# Patient Record
Sex: FEMALE | Race: WHITE | NOT HISPANIC OR LATINO | Employment: OTHER | URBAN - METROPOLITAN AREA
[De-identification: names, ages, dates, MRNs, and addresses within clinical notes are randomized per-mention and may not be internally consistent; named-entity substitution may affect disease eponyms.]

---

## 2017-01-23 DIAGNOSIS — G95.9 CERVICAL MYELOPATHY: Primary | ICD-10-CM

## 2017-02-06 PROBLEM — G95.9 CERVICAL MYELOPATHY: Status: ACTIVE | Noted: 2017-02-06

## 2017-02-16 ENCOUNTER — CLINICAL SUPPORT (OUTPATIENT)
Dept: NEUROSURGERY | Facility: CLINIC | Age: 72
End: 2017-02-16
Payer: MEDICARE

## 2017-02-16 DIAGNOSIS — Z98.1 STATUS POST CERVICAL SPINAL FUSION: Primary | ICD-10-CM

## 2017-02-16 NOTE — PROGRESS NOTES
Pt is 10 days ACDF and corpectomy. No s/s of infection. No sutures or staples to be removed. Pt reports decreased pain from pre operative state but shoulders have been giving her significant pain and she said someone at the hospital told her it was due to surgical positioning.xr and f/u appt reminder given to patient.

## 2017-02-16 NOTE — MR AVS SNAPSHOT
[facility]  [address] [facility] [address]  [facility] [address]  Phone: [phone]  Fax: [phone]                  Aissatou Wilder   2017 1:00 PM   Clinical Support    Description:  Female : 1945   Provider:  [facility]   Department:  [facility]           Diagnoses this Visit        Comments    Status post cervical spinal fusion    -  Primary            To Do List           Future Appointments        Provider Department Dept Phone    3/8/2017 11:00 AM [facility] [id number] [facility] [phone]    3/8/2017 11:30 AM Felipe Egan MD [facility] [phone]      Goals (5 Years of Data)     None      [facility]     [facility] Nurse Care Line -  Assistance  Registered nurses in the [facility] provide clinical advisement, health education, appointment booking, and other advisory services.  Call for this free service at [phone].             Medications           Message regarding Medications     Verify the changes and/or additions to your medication regime listed below are the same as discussed with your clinician today.  If any of these changes or additions are incorrect, please notify your healthcare provider.             Verify that the below list of medications is an accurate representation of the medications you are currently taking.  If none reported, the list may be blank. If incorrect, please contact your healthcare provider. Carry this list with you in case of emergency.           Current Medications     alendronate (FOSAMAX) 70 MG tablet Take 70 mg by mouth every 7 days.    aspirin (ECOTRIN) 81 MG EC tablet Take 81 mg by mouth once daily.    atorvastatin (LIPITOR) 40 MG tablet Take 40 mg by mouth every evening.     clopidogrel (PLAVIX) 75 mg tablet Take 75 mg by mouth once daily.    ergocalciferol (VITAMIN D2) 50,000 unit Cap Take 50,000 Units by mouth every 7 days.     lisinopril-hydrochlorothiazide (PRINZIDE,ZESTORETIC) 20-25 mg Tab Take 1 tablet by mouth once daily.    multivitamin (ONE DAILY MULTIVITAMIN) per tablet Take 1 tablet by mouth once daily.    oxycodone-acetaminophen (PERCOCET) 7.5-325 mg per tablet May take 1-2 tabs by mouth every 4 hours as needed for pain    polyethylene glycol (GLYCOLAX) 17 gram/dose powder Take 17 g by mouth once daily.    tizanidine (ZANAFLEX) 4 MG tablet Take 1 tablet (4 mg total) by mouth every 8 (eight) hours as needed.           Clinical Reference Information           Allergies as of 2/16/2017     No Known Allergies      Immunizations Administered on Date of Encounter - 2/16/2017     None      Orders Placed During Today's Visit     Future Labs/Procedures Expected by Expires    X-Ray Cervical Spine AP And Lateral  2/16/2017 2/16/2018      [facility] Sign-Up     Activating your [facility] account is as easy as 1-2-3!     1) Visit [email or web address], select Sign Up Now, enter this activation code and your date of birth, then select Next.  [email or web address]  Expires: 3/25/2017 11:48 AM      2) Create a username and password to use when you visit [facility] in the future and select a security question in case you lose your password and select Next.    3) Enter your e-mail address and click Sign Up!    Additional Information  If you have questions, please e-mail [email or web address] or call [phone] to talk to our [facility] staff. Remember, [facility] is NOT to be used for urgent needs. For medical emergencies, dial 911.         Language Assistance Services     ATTENTION: Language assistance services are available, free of charge. Please call [phone].      ATENCIÓN: Si habla mirtaanju, tiene a mckinney disposición servicios gratuitos de asistencia lingüística. Llame al [phone].     CHÚ Ý: N?u b?n nói Ti?ng Vi?t, có các d?ch v? h? tr? ngôn ng? mi?n phí dành cho b?n. G?i s? [phone].         [facility] complies with  applicable Federal civil rights laws and does not discriminate on the basis of race, color, national origin, age, disability, or sex.

## 2017-03-16 ENCOUNTER — OFFICE VISIT (OUTPATIENT)
Dept: NEUROSURGERY | Facility: CLINIC | Age: 72
End: 2017-03-16
Payer: MEDICARE

## 2017-03-16 ENCOUNTER — HOSPITAL ENCOUNTER (OUTPATIENT)
Dept: RADIOLOGY | Facility: HOSPITAL | Age: 72
Discharge: HOME | End: 2017-03-16
Attending: NEUROLOGICAL SURGERY
Payer: MEDICARE

## 2017-03-16 VITALS
SYSTOLIC BLOOD PRESSURE: 184 MMHG | BODY MASS INDEX: 24.97 KG/M2 | HEART RATE: 96 BPM | WEIGHT: 132.25 LBS | HEIGHT: 61 IN | DIASTOLIC BLOOD PRESSURE: 75 MMHG

## 2017-03-16 DIAGNOSIS — R27.0 ATAXIA: ICD-10-CM

## 2017-03-16 DIAGNOSIS — M54.40 CHRONIC MIDLINE LOW BACK PAIN WITH SCIATICA, SCIATICA LATERALITY UNSPECIFIED: ICD-10-CM

## 2017-03-16 DIAGNOSIS — G89.29 CHRONIC MIDLINE LOW BACK PAIN WITH SCIATICA, SCIATICA LATERALITY UNSPECIFIED: Primary | ICD-10-CM

## 2017-03-16 DIAGNOSIS — G89.29 CHRONIC MIDLINE LOW BACK PAIN WITH SCIATICA, SCIATICA LATERALITY UNSPECIFIED: ICD-10-CM

## 2017-03-16 DIAGNOSIS — R29.6 FALLS FREQUENTLY: ICD-10-CM

## 2017-03-16 DIAGNOSIS — Z98.1 STATUS POST CERVICAL SPINAL FUSION: ICD-10-CM

## 2017-03-16 DIAGNOSIS — G95.9 MYELOPATHY OF CERVICAL SPINAL CORD WITH CERVICAL RADICULOPATHY: Primary | ICD-10-CM

## 2017-03-16 DIAGNOSIS — M54.9 BACK PAIN, UNSPECIFIED BACK LOCATION, UNSPECIFIED BACK PAIN LATERALITY, UNSPECIFIED CHRONICITY: ICD-10-CM

## 2017-03-16 DIAGNOSIS — Z98.1 S/P CERVICAL SPINAL FUSION: ICD-10-CM

## 2017-03-16 DIAGNOSIS — M54.12 MYELOPATHY OF CERVICAL SPINAL CORD WITH CERVICAL RADICULOPATHY: Primary | ICD-10-CM

## 2017-03-16 DIAGNOSIS — M54.40 CHRONIC MIDLINE LOW BACK PAIN WITH SCIATICA, SCIATICA LATERALITY UNSPECIFIED: Primary | ICD-10-CM

## 2017-03-16 PROCEDURE — 72040 X-RAY EXAM NECK SPINE 2-3 VW: CPT | Mod: 26,,, | Performed by: RADIOLOGY

## 2017-03-16 PROCEDURE — 72114 X-RAY EXAM L-S SPINE BENDING: CPT | Mod: 26,,, | Performed by: RADIOLOGY

## 2017-03-16 PROCEDURE — 99024 POSTOP FOLLOW-UP VISIT: CPT | Mod: S$GLB,,, | Performed by: NEUROLOGICAL SURGERY

## 2017-03-16 PROCEDURE — 72040 X-RAY EXAM NECK SPINE 2-3 VW: CPT | Mod: TC,PO

## 2017-03-16 PROCEDURE — 72114 X-RAY EXAM L-S SPINE BENDING: CPT | Mod: TC,PO

## 2017-03-16 NOTE — MR AVS SNAPSHOT
[facility]  [address] [place] [address]  Phone: [phone]  Fax: [phone]                  Aissatou Wilder   3/16/2017 3:00 PM   Office Visit    Description:  Female : 1945   Provider:  Felipe Egan MD   Department:  [facility]           Reason for Visit     Post-op Evaluation           Diagnoses this Visit        Comments    Myelopathy of cervical spinal cord with cervical radiculopathy    -  Primary     Falls frequently         Back pain, unspecified back location, unspecified back pain laterality, unspecified chronicity         Ataxia         S/P cervical spinal fusion                To Do List           Future Appointments        Provider Department Dept Phone    2017 2:30 PM Felipe Egan MD [facility] [phone]      Goals (5 Years of Data)     None      [facility]     [facility] On [facility] Nurse Care Line - / Assistance  Registered nurses in the [facility] provide clinical advisement, health education, appointment booking, and other advisory services.  Call for this free service at [phone].             Medications           Message regarding Medications     Verify the changes and/or additions to your medication regime listed below are the same as discussed with your clinician today.  If any of these changes or additions are incorrect, please notify your healthcare provider.             Verify that the below list of medications is an accurate representation of the medications you are currently taking.  If none reported, the list may be blank. If incorrect, please contact your healthcare provider. Carry this list with you in case of emergency.           Current Medications     alendronate (FOSAMAX) 70 MG tablet Take 70 mg by mouth every 7 days.    aspirin (ECOTRIN) 81 MG EC tablet Take 81 mg by mouth once daily.    atorvastatin (LIPITOR) 40 MG tablet Take 40 mg by mouth every evening.      "clopidogrel (PLAVIX) 75 mg tablet Take 75 mg by mouth once daily.    ergocalciferol (VITAMIN D2) 50,000 unit Cap Take 50,000 Units by mouth every 7 days.    lisinopril-hydrochlorothiazide (PRINZIDE,ZESTORETIC) 20-25 mg Tab Take 1 tablet by mouth once daily.    multivitamin (ONE DAILY MULTIVITAMIN) per tablet Take 1 tablet by mouth once daily.    oxycodone-acetaminophen (PERCOCET) 7.5-325 mg per tablet May take 1-2 tabs by mouth every 4 hours as needed for pain    polyethylene glycol (GLYCOLAX) 17 gram/dose powder Take 17 g by mouth once daily.           Clinical Reference Information           Your Vitals Were     BP Pulse Height Weight BMI    184/75 96 5' 1" (1.549 m) 60 kg (132 lb 4.4 oz) 24.99 kg/m2      Blood Pressure          Most Recent Value    BP  (!)  184/75      Allergies as of 3/16/2017     No Known Allergies      Immunizations Administered on Date of Encounter - 3/16/2017     None      [facility] Sign-Up     Activating your [facility] account is as easy as 1-2-3!     1) Visit [email or web address], select Sign Up Now, enter this activation code and your date of birth, then select Next.  [email or web address]  Expires: 3/25/2017 12:48 PM      2) Create a username and password to use when you visit [facility] in the future and select a security question in case you lose your password and select Next.    3) Enter your e-mail address and click Sign Up!    Additional Information  If you have questions, please e-mail [email or web address] or call [phone] to talk to our [facility] staff. Remember, [facility] is NOT to be used for urgent needs. For medical emergencies, dial 911.         Language Assistance Services     ATTENTION: Language assistance services are available, free of charge. Please call [phone].      ATENCIÓN: Si habla español, tiene a mckinney disposición servicios gratuitos de asistencia lingüística. Llame al [phone].     CHÚ Ý: N?u b?n nói Ti?ng Vi?t, có các d?ch v? h? tr? ngôn ng? mi?n phí yaz " roxana arevalo?nHazel G?i s? [phone].         [facility] complies with applicable Federal civil rights laws and does not discriminate on the basis of race, color, national origin, age, disability, or sex.

## 2017-03-16 NOTE — PROGRESS NOTES
Neurosurgery History & Physical    Patient ID: Aissatou Wilder is a 71 y.o. female.    Chief Complaint   Patient presents with    Post-op Evaluation     4 weeks s/p C4 corpectomy and C3-5 ACDF. Reports improvement since surgery.        Review of Systems   Constitutional: Negative for appetite change, chills, fever and unexpected weight change.   HENT: Negative for tinnitus, trouble swallowing and voice change.    Respiratory: Negative for apnea, cough, chest tightness and shortness of breath.    Cardiovascular: Negative for chest pain and palpitations.   Gastrointestinal: Negative for constipation, diarrhea, nausea and vomiting.   Genitourinary: Negative for difficulty urinating, dysuria, frequency and urgency.   Musculoskeletal: Positive for back pain. Negative for gait problem.   Skin: Negative for wound.   Neurological: Positive for numbness. Negative for dizziness, tremors, seizures, facial asymmetry, speech difficulty, weakness and light-headedness.   Psychiatric/Behavioral: Negative for confusion and decreased concentration.   All other systems reviewed and are negative.      Past Medical History:   Diagnosis Date    Bunion     Coronary artery disease     Encounter for blood transfusion     Fatty tumor     right upper arm    Fracture of foot bone, left, closed     Fracture of wrist, closed     Hyperlipidemia     Hypertension     Nasal bone fracture     Osteoporosis      Social History     Social History    Marital status:      Spouse name: N/A    Number of children: N/A    Years of education: N/A     Occupational History    Not on file.     Social History Main Topics    Smoking status: Never Smoker    Smokeless tobacco: Not on file    Alcohol use Not on file    Drug use: Not on file    Sexual activity: Not on file     Other Topics Concern    Not on file     Social History Narrative     Family History   Problem Relation Age of Onset    Heart disease Mother     Heart disease  "Father     Diabetes Sister      Review of patient's allergies indicates:  No Known Allergies    Current Outpatient Prescriptions:     alendronate (FOSAMAX) 70 MG tablet, Take 70 mg by mouth every 7 days., Disp: , Rfl:     aspirin (ECOTRIN) 81 MG EC tablet, Take 81 mg by mouth once daily., Disp: , Rfl:     atorvastatin (LIPITOR) 40 MG tablet, Take 40 mg by mouth every evening. , Disp: , Rfl:     clopidogrel (PLAVIX) 75 mg tablet, Take 75 mg by mouth once daily., Disp: , Rfl:     ergocalciferol (VITAMIN D2) 50,000 unit Cap, Take 50,000 Units by mouth every 7 days., Disp: , Rfl:     lisinopril-hydrochlorothiazide (PRINZIDE,ZESTORETIC) 20-25 mg Tab, Take 1 tablet by mouth once daily., Disp: , Rfl:     multivitamin (ONE DAILY MULTIVITAMIN) per tablet, Take 1 tablet by mouth once daily., Disp: , Rfl:     oxycodone-acetaminophen (PERCOCET) 7.5-325 mg per tablet, May take 1-2 tabs by mouth every 4 hours as needed for pain, Disp: 60 tablet, Rfl: 0    polyethylene glycol (GLYCOLAX) 17 gram/dose powder, Take 17 g by mouth once daily., Disp: 1 Bottle, Rfl: 1  Blood pressure (!) 184/75, pulse 96, height 5' 1" (1.549 m), weight 60 kg (132 lb 4.4 oz).      Neurologic Exam     Mental Status   Oriented to person, place, and time.   Follows 3 step commands.   Attention: normal. Concentration: normal.   Speech: speech is normal   Level of consciousness: alert  Knowledge: consistent with education.   Able to name object. Normal comprehension.     Cranial Nerves     CN II   Visual acuity: normal  Right visual field deficit: none  Left visual field deficit: none     CN III, IV, VI   Pupils are equal, round, and reactive to light.  Extraocular motions are normal.   Right pupil: Size: 3 mm. Shape: regular. Reactivity: brisk. Consensual response: intact. Accommodation: intact.   Left pupil: Size: 3 mm. Shape: regular. Reactivity: brisk. Consensual response: intact. Accommodation: intact.   CN III: no CN III palsy  Nystagmus: none "   Diplopia: none  Ophthalmoparesis: none  Conjugate gaze: present    CN V   Right facial sensation deficit: none  Left facial sensation deficit: none    CN VII   Right facial weakness: none  Left facial weakness: none    CN VIII   Hearing: intact    CN IX, X   CN IX normal.   CN X normal.     CN XI   Right sternocleidomastoid strength: normal  Left sternocleidomastoid strength: normal  Right trapezius strength: normal  Left trapezius strength: normal    CN XII   Fasciculations: absent  Tongue deviation: none    Motor Exam   Muscle bulk: decreased  Overall muscle tone: normal  Right arm pronator drift: absent  Left arm pronator drift: absent    Strength   Strength 5/5 except as noted.   Right interossei: 4/5  Left interossei: 4/5  Right quadriceps: 4/5  Left quadriceps: 4/5       Right hand intrinsic weakness 4+/5  Hip flexion weakness 4+/5       Sensory Exam   Right arm light touch: decreased from wrist  Left arm light touch: decreased from wrist  Right leg light touch: decreased from knee  Left leg light touch: decreased from knee  Vibration normal.   Right arm pinprick: decreased from wrist  Left arm pinprick: decreased from wrist  Right leg pinprick: decreased from knee  Left leg pinprick: decreased from knee    Gait, Coordination, and Reflexes     Coordination   Romberg: positive  Finger to nose coordination: normal  Heel to shin coordination: normal  Tandem walking coordination: abnormal    Tremor   Resting tremor: absent  Intention tremor: absent  Action tremor: absent    Reflexes   Right brachioradialis: 3+  Left brachioradialis: 3+  Right biceps: 3+  Left biceps: 3+  Right triceps: 3+  Left triceps: 3+  Right patellar: 3+  Left patellar: 3+  Right achilles: 3+  Left achilles: 3+  Right : 3+  Left : 3+  Right plantar: normal  Left plantar: normal  Right Mancia: present  Left Mancia: present  Right ankle clonus: absent  Left ankle clonus: absent       ataxia       Physical Exam   Constitutional: She  is oriented to person, place, and time. She appears well-developed and well-nourished.   HENT:   Head: Normocephalic and atraumatic.   Eyes: EOM are normal. Pupils are equal, round, and reactive to light.   Neck: Normal range of motion. Neck supple.   Cardiovascular: Normal rate and intact distal pulses.    Pulmonary/Chest: Effort normal. No respiratory distress.   Abdominal: Soft. She exhibits no distension.   Musculoskeletal: Normal range of motion. She exhibits no edema or deformity.   Neurological: She is oriented to person, place, and time. She has an abnormal Romberg Test and an abnormal Tandem Gait Test. She has a normal Finger-Nose-Finger Test and a normal Heel to Shin Test.   Reflex Scores:       Tricep reflexes are 3+ on the right side and 3+ on the left side.       Bicep reflexes are 3+ on the right side and 3+ on the left side.       Brachioradialis reflexes are 3+ on the right side and 3+ on the left side.       Patellar reflexes are 3+ on the right side and 3+ on the left side.       Achilles reflexes are 3+ on the right side and 3+ on the left side.  Skin: Skin is warm and dry.   Well healed anterior neck incision   Psychiatric: She has a normal mood and affect. Her speech is normal and behavior is normal. Judgment and thought content normal.   Nursing note and vitals reviewed.      Oswestry Disability Index score: 0    Patient Health Questionnaire score: 1    Provider dictation:  The patient is a 71 year-old right-handed  female following up 4 weeks s/p C4 corpectomy and C4-7 ACCF for myelopathy.    She has continued to have gait unsteadiness but her neck pain and hand numbness have resolved. The inisicon is well healed.    Clinical exam significant for known hyperreflexia throughout, ataxia, and mild weakness in right hand intrinsics. She has positive Mancia's sign bilaterally.     The xray post-op show excellent positioning of instrumentation. The known carpal and ulnar neuropathies can  be treated thereafter if needed. She reports low back pain and we will obtain lumbar xrays before her next visit.    We have fitted her with a bone growth stimulator.    There are no diagnoses linked to this encounter.

## 2017-05-12 ENCOUNTER — TELEPHONE (OUTPATIENT)
Dept: NEUROSURGERY | Facility: CLINIC | Age: 72
End: 2017-05-12

## 2017-05-12 NOTE — TELEPHONE ENCOUNTER
Patient is now 3 months s/p C4 corpectomy. Called to inquire about post-operative state. No answer. Left voicemail.

## 2017-05-12 NOTE — TELEPHONE ENCOUNTER
Patient is now 3 months s/p C4 corpectomy. Called to inquire about post-operative state. Patient is showing significant improvement since the surgery. denies weakness or numbness in extremities. Patient states she continues to have some dysphagia but this continues to improve as well.     Patient very pleased with surgical outcome. Instructed to call our office with any future questions/concerns or if any recurrent pain occurs. Patient is scheduled to come in late June to have her low back pain evaluated. Verbalized understanding.     Updated oswestry: 32 % --- assessing for lumbar spine pain as well.

## 2017-06-22 ENCOUNTER — OFFICE VISIT (OUTPATIENT)
Dept: NEUROSURGERY | Facility: CLINIC | Age: 72
End: 2017-06-22
Payer: MEDICARE

## 2017-06-22 VITALS
HEART RATE: 68 BPM | HEIGHT: 61 IN | BODY MASS INDEX: 24.97 KG/M2 | WEIGHT: 132.25 LBS | SYSTOLIC BLOOD PRESSURE: 114 MMHG | DIASTOLIC BLOOD PRESSURE: 82 MMHG

## 2017-06-22 DIAGNOSIS — G89.29 CHRONIC BILATERAL LOW BACK PAIN, WITH SCIATICA PRESENCE UNSPECIFIED: ICD-10-CM

## 2017-06-22 DIAGNOSIS — Z98.890 HISTORY OF LUMBAR SURGERY: ICD-10-CM

## 2017-06-22 DIAGNOSIS — Z98.890 H/O CERVICAL SPINE SURGERY: Primary | ICD-10-CM

## 2017-06-22 DIAGNOSIS — M54.5 CHRONIC BILATERAL LOW BACK PAIN, WITH SCIATICA PRESENCE UNSPECIFIED: ICD-10-CM

## 2017-06-22 DIAGNOSIS — R29.6 FALLS FREQUENTLY: ICD-10-CM

## 2017-06-22 PROCEDURE — 1159F MED LIST DOCD IN RCRD: CPT | Mod: S$GLB,,, | Performed by: PHYSICIAN ASSISTANT

## 2017-06-22 PROCEDURE — 1125F AMNT PAIN NOTED PAIN PRSNT: CPT | Mod: S$GLB,,, | Performed by: PHYSICIAN ASSISTANT

## 2017-06-22 PROCEDURE — 99213 OFFICE O/P EST LOW 20 MIN: CPT | Mod: S$GLB,,, | Performed by: PHYSICIAN ASSISTANT

## 2017-06-23 NOTE — PROGRESS NOTES
Neurosurgery History & Physical    Patient ID: Aissatou Wilder is a 71 y.o. female.    Chief Complaint   Patient presents with    Follow-up     Patient is now 4 months s/p C4 corpectomy and C4-7 ACCF for myelopathy. States her neck continues to improve. She is here to follow up for her back pain.        Review of Systems   Constitutional: Negative for appetite change, chills, fever and unexpected weight change.   HENT: Negative for tinnitus, trouble swallowing and voice change.    Respiratory: Negative for apnea, cough, chest tightness and shortness of breath.    Cardiovascular: Negative for chest pain and palpitations.   Gastrointestinal: Negative for constipation, diarrhea, nausea and vomiting.   Genitourinary: Negative for difficulty urinating, dysuria, frequency and urgency.   Musculoskeletal: Positive for back pain. Negative for gait problem.   Skin: Negative for wound.   Neurological: Positive for numbness. Negative for dizziness, tremors, seizures, facial asymmetry, speech difficulty, weakness and light-headedness.   Psychiatric/Behavioral: Negative for confusion and decreased concentration.   All other systems reviewed and are negative.      Past Medical History:   Diagnosis Date    Bunion     Coronary artery disease     Encounter for blood transfusion     Fatty tumor     right upper arm    Fracture of foot bone, left, closed     Fracture of wrist, closed     Hyperlipidemia     Hypertension     Nasal bone fracture     Osteoporosis      Social History     Social History    Marital status:      Spouse name: N/A    Number of children: N/A    Years of education: N/A     Occupational History    Not on file.     Social History Main Topics    Smoking status: Never Smoker    Smokeless tobacco: Not on file    Alcohol use Not on file    Drug use: Unknown    Sexual activity: Not on file     Other Topics Concern    Not on file     Social History Narrative    No narrative on file     Family  "History   Problem Relation Age of Onset    Heart disease Mother     Heart disease Father     Diabetes Sister      Review of patient's allergies indicates:  No Known Allergies    Current Outpatient Prescriptions:     alendronate (FOSAMAX) 70 MG tablet, Take 70 mg by mouth every 7 days., Disp: , Rfl:     aspirin (ECOTRIN) 81 MG EC tablet, Take 81 mg by mouth once daily., Disp: , Rfl:     atorvastatin (LIPITOR) 40 MG tablet, Take 40 mg by mouth every evening. , Disp: , Rfl:     clopidogrel (PLAVIX) 75 mg tablet, Take 75 mg by mouth once daily., Disp: , Rfl:     ergocalciferol (VITAMIN D2) 50,000 unit Cap, Take 50,000 Units by mouth every 7 days., Disp: , Rfl:     lisinopril-hydrochlorothiazide (PRINZIDE,ZESTORETIC) 20-25 mg Tab, Take 1 tablet by mouth once daily., Disp: , Rfl:     multivitamin (ONE DAILY MULTIVITAMIN) per tablet, Take 1 tablet by mouth once daily., Disp: , Rfl:     oxycodone-acetaminophen (PERCOCET) 7.5-325 mg per tablet, May take 1-2 tabs by mouth every 4 hours as needed for pain, Disp: 60 tablet, Rfl: 0    polyethylene glycol (GLYCOLAX) 17 gram/dose powder, Take 17 g by mouth once daily., Disp: 1 Bottle, Rfl: 1  Blood pressure 114/82, pulse 68, height 5' 1" (1.549 m), weight 60 kg (132 lb 4.4 oz).      Neurologic Exam     Mental Status   Oriented to person, place, and time.   Follows 3 step commands.   Attention: normal. Concentration: normal.   Speech: speech is normal   Level of consciousness: alert  Knowledge: consistent with education.   Able to name object. Normal comprehension.     Cranial Nerves     CN II   Visual acuity: normal  Right visual field deficit: none  Left visual field deficit: none     CN III, IV, VI   Pupils are equal, round, and reactive to light.  Extraocular motions are normal.   Right pupil: Size: 3 mm. Shape: regular. Reactivity: brisk. Consensual response: intact. Accommodation: intact.   Left pupil: Size: 3 mm. Shape: regular. Reactivity: brisk. Consensual " response: intact. Accommodation: intact.   CN III: no CN III palsy  Nystagmus: none   Diplopia: none  Ophthalmoparesis: none  Conjugate gaze: present    CN V   Right facial sensation deficit: none  Left facial sensation deficit: none    CN VII   Right facial weakness: none  Left facial weakness: none    CN VIII   Hearing: intact    CN IX, X   CN IX normal.   CN X normal.     CN XI   Right sternocleidomastoid strength: normal  Left sternocleidomastoid strength: normal  Right trapezius strength: normal  Left trapezius strength: normal    CN XII   Fasciculations: absent  Tongue deviation: none    Motor Exam   Muscle bulk: decreased  Overall muscle tone: normal  Right arm pronator drift: absent  Left arm pronator drift: absent    Strength   Strength 5/5 except as noted.   Right interossei: 4/5  Left interossei: 4/5  Right quadriceps: 4/5  Left quadriceps: 4/5Right hand intrinsic weakness 4+/5  Hip flexion weakness 4+/5       Sensory Exam   Right arm light touch: decreased from wrist  Left arm light touch: decreased from wrist  Right leg light touch: decreased from knee  Left leg light touch: decreased from knee  Vibration normal.   Right arm pinprick: decreased from wrist  Left arm pinprick: decreased from wrist  Right leg pinprick: decreased from knee  Left leg pinprick: decreased from knee    Gait, Coordination, and Reflexes     Coordination   Romberg: positive  Finger to nose coordination: normal  Heel to shin coordination: normal  Tandem walking coordination: abnormal    Tremor   Resting tremor: absent  Intention tremor: absent  Action tremor: absent    Reflexes   Right brachioradialis: 3+  Left brachioradialis: 3+  Right biceps: 3+  Left biceps: 3+  Right triceps: 3+  Left triceps: 3+  Right patellar: 3+  Left patellar: 3+  Right achilles: 3+  Left achilles: 3+  Right : 3+  Left : 3+  Right plantar: normal  Left plantar: normal  Right Mancia: present  Left Mancia: present  Right ankle clonus: absent  Left  ankle clonus: absentataxia       Physical Exam   Constitutional: She is oriented to person, place, and time. She appears well-developed and well-nourished.   HENT:   Head: Normocephalic and atraumatic.   Eyes: EOM are normal. Pupils are equal, round, and reactive to light.   Neck: Normal range of motion. Neck supple.   Cardiovascular: Normal rate and intact distal pulses.    Pulmonary/Chest: Effort normal. No respiratory distress.   Abdominal: Soft. She exhibits no distension.   Musculoskeletal: Normal range of motion. She exhibits no edema or deformity.   Neurological: She is oriented to person, place, and time. She has an abnormal Romberg Test and an abnormal Tandem Gait Test. She has a normal Finger-Nose-Finger Test and a normal Heel to Shin Test.   Reflex Scores:       Tricep reflexes are 3+ on the right side and 3+ on the left side.       Bicep reflexes are 3+ on the right side and 3+ on the left side.       Brachioradialis reflexes are 3+ on the right side and 3+ on the left side.       Patellar reflexes are 3+ on the right side and 3+ on the left side.       Achilles reflexes are 3+ on the right side and 3+ on the left side.  Skin: Skin is warm and dry.   Well healed anterior neck incision   Psychiatric: She has a normal mood and affect. Her speech is normal and behavior is normal. Judgment and thought content normal.   Nursing note and vitals reviewed.    Provider dictation:  The patient is a 71 year-old right-handed  female following up 4 months s/p C4 corpectomy and C4-7 ACCF for myelopathy and for lower back pain after undergoing lumbar xrays.     She has continued to have gait unsteadiness and dizziness but her neck pain and hand numbness have resolved.  Dizziness causes her to fall at times.  She is working with her PCP to determine a cause.  We discussed the importance of preventing falls affecting her neck.  She should use a walker or cane to steady her self and prevent falls.     She has had  intermittent bilateral hip pain and midline lower back pain.  She has intermittent brief episodes of achiness in the bilateral legs.  She has had 2 lumbar surgeries in the past.    Clinical exam significant for known hyperreflexia throughout, ataxia, and mild weakness in right hand intrinsics. She has positive Mancia's sign bilaterally. She has full strength and sensation in the legs.     Lumbar spine xrays from 3-16-17 reveal degenerative facet arthropathy and disk hidght loss at L4/5 and L5/S1.  There is 6mm anterolisthesis of L4 on L5 with no evidence of instabiliyt.    She is doing well in regards to her cervical spine and should continue to wear the bone growth stimulator previosusly ordered until 6 months post op.  In regards to her lower back, she does have degenerative changes.  It is unclear from her intermittent symptoms if there is significatn nerve compression in the lumbar spine to contribute to her back and leg pain.  We discussed obtaining an MRI lumbar spine, but she prefers not to at this time as she is not interested in further treatment for her back at this time.  If her back ever does bother her enough taht she would like further evaluation, we will get an MRI at that time.    Aissatou was seen today for follow-up.    Diagnoses and all orders for this visit:    H/O cervical spine surgery    History of lumbar surgery    Chronic bilateral low back pain, with sciatica presence unspecified    Falls frequently

## 2018-10-09 DIAGNOSIS — M50.30 DDD (DEGENERATIVE DISC DISEASE), CERVICAL: Primary | ICD-10-CM

## 2018-10-11 ENCOUNTER — OFFICE VISIT (OUTPATIENT)
Dept: NEUROSURGERY | Facility: CLINIC | Age: 73
End: 2018-10-11
Payer: MEDICARE

## 2018-10-11 ENCOUNTER — HOSPITAL ENCOUNTER (OUTPATIENT)
Dept: RADIOLOGY | Facility: HOSPITAL | Age: 73
Discharge: HOME | End: 2018-10-11
Attending: PHYSICIAN ASSISTANT
Payer: MEDICARE

## 2018-10-11 VITALS
RESPIRATION RATE: 16 BRPM | BODY MASS INDEX: 24.92 KG/M2 | SYSTOLIC BLOOD PRESSURE: 117 MMHG | HEIGHT: 61 IN | TEMPERATURE: 96 F | DIASTOLIC BLOOD PRESSURE: 82 MMHG | HEART RATE: 67 BPM | WEIGHT: 132 LBS

## 2018-10-11 DIAGNOSIS — M50.30 DDD (DEGENERATIVE DISC DISEASE), CERVICAL: ICD-10-CM

## 2018-10-11 DIAGNOSIS — Z98.1 S/P CERVICAL SPINAL FUSION: ICD-10-CM

## 2018-10-11 DIAGNOSIS — G95.9 CERVICAL MYELOPATHY: Primary | ICD-10-CM

## 2018-10-11 PROCEDURE — 72052 X-RAY EXAM NECK SPINE 6/>VWS: CPT | Mod: 26,,, | Performed by: RADIOLOGY

## 2018-10-11 PROCEDURE — 3288F FALL RISK ASSESSMENT DOCD: CPT | Mod: CPTII,S$GLB,, | Performed by: PHYSICIAN ASSISTANT

## 2018-10-11 PROCEDURE — 72052 X-RAY EXAM NECK SPINE 6/>VWS: CPT | Mod: TC,FY,PO

## 2018-10-11 PROCEDURE — 1100F PTFALLS ASSESS-DOCD GE2>/YR: CPT | Mod: CPTII,S$GLB,, | Performed by: PHYSICIAN ASSISTANT

## 2018-10-11 PROCEDURE — 99213 OFFICE O/P EST LOW 20 MIN: CPT | Mod: S$GLB,,, | Performed by: PHYSICIAN ASSISTANT

## 2018-10-11 PROCEDURE — 99999 PR PBB SHADOW E&M-EST. PATIENT-LVL IV: CPT | Mod: PBBFAC,,, | Performed by: PHYSICIAN ASSISTANT

## 2018-10-11 NOTE — LETTER
October 19, 2018      Mariusz Duenas MD  [address]  [facility] [facility] [address]           [place] [facility]  [address] [facility] [address] [place] [address]  Phone: [phone]  Fax: [phone]          Patient: Aissatou Wilder   MR Number: [id number]   YOB: 1945   Date of Visit: 10/11/2018       Dear Dr. Mariusz Duenas:    Thank you for referring Aissatou Wilder to me for evaluation. Attached you will find relevant portions of my assessment and plan of care.    If you have questions, please do not hesitate to call me. I look forward to following Aissatou Wilder along with you.    Sincerely,    Rickey Mazariegos  CC:  No Recipients    If you would like to receive this communication electronically, please contact [email or web address] or [phone] to request more information on [facility] Link access.    For providers and/or their staff who would like to refer a patient to [facility], please contact us through our one-stop-shop provider referral line, [facility], at [phone].    If you feel you have received this communication in error or would no longer like to receive these types of communications, please e-mail [email or web address]

## 2018-10-23 ENCOUNTER — OFFICE VISIT (OUTPATIENT)
Dept: NEUROLOGY | Facility: CLINIC | Age: 73
End: 2018-10-23
Payer: MEDICARE

## 2018-10-23 VITALS
HEART RATE: 70 BPM | DIASTOLIC BLOOD PRESSURE: 81 MMHG | WEIGHT: 119 LBS | SYSTOLIC BLOOD PRESSURE: 152 MMHG | BODY MASS INDEX: 22.47 KG/M2 | HEIGHT: 61 IN | RESPIRATION RATE: 18 BRPM

## 2018-10-23 DIAGNOSIS — R29.6 FALLS FREQUENTLY: ICD-10-CM

## 2018-10-23 DIAGNOSIS — R27.0 ATAXIA: ICD-10-CM

## 2018-10-23 DIAGNOSIS — G31.84 MCI (MILD COGNITIVE IMPAIRMENT) WITH MEMORY LOSS: Primary | ICD-10-CM

## 2018-10-23 PROCEDURE — 99205 OFFICE O/P NEW HI 60 MIN: CPT | Mod: S$PBB,,, | Performed by: NURSE PRACTITIONER

## 2018-10-23 PROCEDURE — 1100F PTFALLS ASSESS-DOCD GE2>/YR: CPT | Mod: CPTII,,, | Performed by: NURSE PRACTITIONER

## 2018-10-23 PROCEDURE — 99214 OFFICE O/P EST MOD 30 MIN: CPT | Mod: PBBFAC,PN | Performed by: NURSE PRACTITIONER

## 2018-10-23 PROCEDURE — 99999 PR PBB SHADOW E&M-EST. PATIENT-LVL IV: CPT | Mod: PBBFAC,,, | Performed by: NURSE PRACTITIONER

## 2018-10-23 PROCEDURE — 3288F FALL RISK ASSESSMENT DOCD: CPT | Mod: CPTII,,, | Performed by: NURSE PRACTITIONER

## 2018-10-23 RX ORDER — AMIODARONE HYDROCHLORIDE 200 MG/1
TABLET ORAL DAILY
COMMUNITY

## 2018-10-23 RX ORDER — METOPROLOL SUCCINATE 25 MG/1
25 TABLET, EXTENDED RELEASE ORAL DAILY
COMMUNITY

## 2018-10-23 RX ORDER — LEVOTHYROXINE SODIUM 100 UG/1
100 TABLET ORAL DAILY
COMMUNITY

## 2018-10-23 RX ORDER — MIDODRINE HYDROCHLORIDE 10 MG/1
10 TABLET ORAL 3 TIMES DAILY
Refills: 3 | COMMUNITY
Start: 2018-10-06

## 2018-10-23 NOTE — PROGRESS NOTES
"       Patient ID: Aissatou Wilder is a 73 y.o. female.    Chief Complaint:  Memory Loss    History of Present Illness  Mrs. Wilder is a 73 year old female here for evaluation of complaints of memory loss.  Patient does not feel she has any issues with her memory.   reports she is more repetitive. Forgets shy she enters rooms. Goes to the store and forgets what she was supposed to .   manages her medications. Cooking without incident.  Drives in the neighborhood without incidents.She has not driven for a few months because her car is broken.  She lives in close walking distance to [employer] and several restaurants.  Remembers to feed her 3 dogs.  She has 3 daughters that are not involved in her care.  She is angry at her  and PCP for scheduling this visit. She thinks her  is trying to "put her somewhere".  She walks to the store several times a day for her .  She has no difficulty managing her home and arranging meals.   requires 24 hour O2 support.  Spends his day in the recliner.  Can only walk short distances.    Review of Systems  Review of Systems   Constitutional: Positive for fatigue.   HENT: Negative.    Eyes: Negative.    Respiratory: Negative.    Cardiovascular: Negative.    Gastrointestinal: Positive for nausea.        Difficulty swallowing   Genitourinary: Positive for frequency.        Urinary incontinence   Musculoskeletal: Positive for back pain, joint swelling, myalgias and neck pain.   Neurological: Positive for dizziness.        Memory loss   Hematological: Bruises/bleeds easily.        Recently stopped Plavix       Objective:      Neurologic Exam    Physical Exam       No imaging to review.    No recent labs to review.  Started on thyroid medication yesterday. I do not know what the results of her TSH.    Assessment:     MCI with Memory Loss    Plan:       -Reviewed results of cognitive screen and answered questions.  Patient became " "very aggravated during testing.  She refused to finish test after she had difficulty with VS/Executive function, immediate recall, attention and serial 7s. She refused any further cognitive testing.  We talked at length about the reason for this visit.  She is very angry with her  and PCP for arranging this appointment.  She continually states there is nothing wrong with her.  She insists her  is trying to make her appear crazy.  I insured her I only wanted to help her and I had no reason to think she was "Crazy"  I just wanted to determine if she might have some signs of short term memory issues that were more than normal for her age. If we did assess some cognitive issues, I might be able to offer some medications to enhance her cognitive function.  She was receptive to my opinion but does not want to finish testing or any further diagnostic tests.  I has asked her to think about coming back in 6 months if she feels she is having more memory loss.  She has agreed to this plan.   -Discussed MCI vs normal aging  -Diagnostic work up- MRI Refuses MRI.  Need labs from PCP office.  I am assuming TSH was high since she started on medication yesterday for thyroid supplementation.  Discussed possible memory issues related to hypothyroidism  -Discussed safety issues related to MCI- medication management, cooking, managing finances, driving  assists with medication and financial management.  -Discussed medications for cognitive enhancement- AChEIs and Namenda CR  Refused to discuss medications.  Would recommend Donepezil titration after thyroid normalizes.  She can discuss with PCP  -Discussed NP testing .  Does not want NP testing at this time  -Follow up in 6 months if patient is willing.  Can continue care with PCP at this time.    "

## 2018-10-23 NOTE — LETTER
October 23, 2018      Vida Kunz MD  [address]  [place] [address]           [facility]  [address] [facility] [address] [place] [address]  Phone: [phone]  Fax: [phone]          Patient: Aissatou Wilder   MR Number: [id number]   YOB: 1945   Date of Visit: 10/23/2018       Dear Dr. Vida Kunz:    Thank you for referring Aissatou Wilder to me for evaluation. Attached you will find relevant portions of my assessment and plan of care.    If you have questions, please do not hesitate to call me. I look forward to following Aissatou Wilder along with you.    Sincerely,    Chiara Holder, ANTHONY    Enclosure  CC:  No Recipients    If you would like to receive this communication electronically, please contact [email or web address] or [phone] to request more information on [facility] Link access.    For providers and/or their staff who would like to refer a patient to [facility], please contact us through our one-stop-shop provider referral line, [facility], at [phone].    If you feel you have received this communication in error or would no longer like to receive these types of communications, please e-mail [email or web address]

## 2018-11-05 ENCOUNTER — TELEPHONE (OUTPATIENT)
Dept: NEUROSURGERY | Facility: CLINIC | Age: 73
End: 2018-11-05

## 2018-11-05 NOTE — TELEPHONE ENCOUNTER
Left message with office staff to have Dr. Kunz return our call re: message below.   ----- Message from Feliz Bhat sent at 11/5/2018  9:27 AM CST -----  Type: Needs Medical Advice    Who Called:  Dr. Vida Kunz    Best Call Back Number: [phone]  Additional Information: Caller states that she needs information on the patient's visit on 10/11  Please call to advise

## 2018-11-05 NOTE — PROGRESS NOTES
Neurosurgery History & Physical    Patient ID: Aissatou Wilder is a 73 y.o. female.    Chief Complaint   Patient presents with    Follow-up     pt states she received a phone call from [facility] as a reminder to follow up with our office. pt denies any pain today.       Review of Systems   Constitutional: Negative for appetite change, chills, fever and unexpected weight change.   HENT: Negative for tinnitus, trouble swallowing and voice change.    Respiratory: Negative for apnea, cough, chest tightness and shortness of breath.    Cardiovascular: Negative for chest pain and palpitations.   Gastrointestinal: Negative for constipation, diarrhea, nausea and vomiting.   Genitourinary: Negative for difficulty urinating, dysuria, frequency and urgency.   Musculoskeletal: Positive for back pain. Negative for gait problem.   Skin: Negative for wound.   Neurological: Positive for numbness. Negative for dizziness, tremors, seizures, facial asymmetry, speech difficulty, weakness and light-headedness.   Psychiatric/Behavioral: Negative for confusion and decreased concentration.   All other systems reviewed and are negative.      Past Medical History:   Diagnosis Date    Bunion     Coronary artery disease     Encounter for blood transfusion     Fatty tumor     right upper arm    Fracture of foot bone, left, closed     Fracture of wrist, closed     Hyperlipidemia     Hypertension     Nasal bone fracture     Osteoporosis      Social History     Socioeconomic History    Marital status:      Spouse name: Not on file    Number of children: Not on file    Years of education: Not on file    Highest education level: Not on file   Social Needs    Financial resource strain: Not on file    Food insecurity - worry: Not on file    Food insecurity - inability: Not on file    Transportation needs - medical: Not on file    Transportation needs - non-medical: Not on file   Occupational History    Not on file   Tobacco  "Use    Smoking status: Never Smoker   Substance and Sexual Activity    Alcohol use: No     Frequency: Never    Drug use: No    Sexual activity: Not on file   Other Topics Concern    Not on file   Social History Narrative    Not on file     Family History   Problem Relation Age of Onset    Heart disease Mother     Heart disease Father     Diabetes Sister      Review of patient's allergies indicates:  No Known Allergies    Current Outpatient Medications:     alendronate (FOSAMAX) 70 MG tablet, Take 70 mg by mouth every 7 days., Disp: , Rfl:     aspirin (ECOTRIN) 81 MG EC tablet, Take 81 mg by mouth once daily., Disp: , Rfl:     atorvastatin (LIPITOR) 40 MG tablet, Take 40 mg by mouth every evening. , Disp: , Rfl:     ergocalciferol (VITAMIN D2) 50,000 unit Cap, Take 50,000 Units by mouth every 7 days., Disp: , Rfl:     lisinopril-hydrochlorothiazide (PRINZIDE,ZESTORETIC) 20-25 mg Tab, Take 1 tablet by mouth once daily., Disp: , Rfl:     multivitamin (ONE DAILY MULTIVITAMIN) per tablet, Take 1 tablet by mouth once daily., Disp: , Rfl:     amiodarone (PACERONE) 200 MG Tab, Take by mouth once daily., Disp: , Rfl:     clopidogrel (PLAVIX) 75 mg tablet, Take 75 mg by mouth once daily., Disp: , Rfl:     levothyroxine (SYNTHROID) 100 MCG tablet, Take 100 mcg by mouth once daily., Disp: , Rfl:     metoprolol succinate (TOPROL-XL) 25 MG 24 hr tablet, Take 25 mg by mouth once daily., Disp: , Rfl:     midodrine (PROAMATINE) 10 MG tablet, Take 10 mg by mouth 3 (three) times daily., Disp: , Rfl: 3    oxycodone-acetaminophen (PERCOCET) 7.5-325 mg per tablet, May take 1-2 tabs by mouth every 4 hours as needed for pain, Disp: 60 tablet, Rfl: 0    polyethylene glycol (GLYCOLAX) 17 gram/dose powder, Take 17 g by mouth once daily., Disp: 1 Bottle, Rfl: 1  Blood pressure 117/82, pulse 67, temperature 96.1 °F (35.6 °C), resp. rate 16, height 5' 1" (1.549 m), weight 59.9 kg (132 lb).      Neurologic Exam     Mental " Status   Oriented to person, place, and time.   Follows 3 step commands.   Attention: normal. Concentration: normal.   Speech: speech is normal   Level of consciousness: alert  Knowledge: consistent with education.   Able to name object. Normal comprehension.     Cranial Nerves     CN II   Visual acuity: normal  Right visual field deficit: none  Left visual field deficit: none     CN III, IV, VI   Pupils are equal, round, and reactive to light.  Extraocular motions are normal.   Right pupil: Size: 3 mm. Shape: regular. Reactivity: brisk. Consensual response: intact. Accommodation: intact.   Left pupil: Size: 3 mm. Shape: regular. Reactivity: brisk. Consensual response: intact. Accommodation: intact.   CN III: no CN III palsy  Nystagmus: none   Diplopia: none  Ophthalmoparesis: none  Conjugate gaze: present    CN V   Right facial sensation deficit: none  Left facial sensation deficit: none    CN VII   Right facial weakness: none  Left facial weakness: none    CN VIII   Hearing: intact    CN IX, X   CN IX normal.   CN X normal.     CN XI   Right sternocleidomastoid strength: normal  Left sternocleidomastoid strength: normal  Right trapezius strength: normal  Left trapezius strength: normal    CN XII   Fasciculations: absent  Tongue deviation: none    Motor Exam   Muscle bulk: decreased  Overall muscle tone: normal  Right arm pronator drift: absent  Left arm pronator drift: absent    Strength   Strength 5/5 except as noted.   Right interossei: 4/5  Left interossei: 4/5  Right quadriceps: 4/5  Left quadriceps: 4/5Right hand intrinsic weakness 4+/5  Hip flexion weakness 4+/5       Sensory Exam   Right arm light touch: decreased from wrist  Left arm light touch: decreased from wrist  Right leg light touch: decreased from knee  Left leg light touch: decreased from knee  Vibration normal.   Right arm pinprick: decreased from wrist  Left arm pinprick: decreased from wrist  Right leg pinprick: decreased from knee  Left leg  pinprick: decreased from knee    Gait, Coordination, and Reflexes     Coordination   Romberg: positive  Finger to nose coordination: normal  Heel to shin coordination: normal  Tandem walking coordination: abnormal    Tremor   Resting tremor: absent  Intention tremor: absent  Action tremor: absent    Reflexes   Right brachioradialis: 3+  Left brachioradialis: 3+  Right biceps: 3+  Left biceps: 3+  Right triceps: 3+  Left triceps: 3+  Right patellar: 3+  Left patellar: 3+  Right achilles: 3+  Left achilles: 3+  Right : 3+  Left : 3+  Right plantar: normal  Left plantar: normal  Right Mancia: present  Left Mancia: present  Right ankle clonus: absent  Left ankle clonus: absentataxia       Physical Exam   Constitutional: She is oriented to person, place, and time. She appears well-developed and well-nourished.   HENT:   Head: Normocephalic and atraumatic.   Eyes: EOM are normal. Pupils are equal, round, and reactive to light.   Neck: Normal range of motion. Neck supple.   Cardiovascular: Normal rate and intact distal pulses.   Pulmonary/Chest: Effort normal. No respiratory distress.   Abdominal: Soft. She exhibits no distension.   Musculoskeletal: Normal range of motion. She exhibits no edema or deformity.   Neurological: She is oriented to person, place, and time. She has an abnormal Romberg Test and an abnormal Tandem Gait Test. She has a normal Finger-Nose-Finger Test and a normal Heel to Shin Test.   Reflex Scores:       Tricep reflexes are 3+ on the right side and 3+ on the left side.       Bicep reflexes are 3+ on the right side and 3+ on the left side.       Brachioradialis reflexes are 3+ on the right side and 3+ on the left side.       Patellar reflexes are 3+ on the right side and 3+ on the left side.       Achilles reflexes are 3+ on the right side and 3+ on the left side.  Skin: Skin is warm and dry.   Well healed anterior neck incision   Psychiatric: She has a normal mood and affect. Her speech is  normal and behavior is normal. Judgment and thought content normal.   Nursing note and vitals reviewed.    Provider dictation:  The patient is a 73 year-old right-handed  female following up 1.5 years s/p C4 corpectomy and C4-7 ACDF for myelopathy.  She denies any symptoms today and is very happy with the results of her surgery and is only following up due to phone recall for an appointment today.  She denies neck pain numbness or tingling.  She still has some gait unsteadiness but it is significantly improved postoperatively.      Clinical exam significant for known hyperreflexia throughout, ataxia, and mild weakness in right hand intrinsics. She has positive Mancia's sign bilaterally. She has full strength and sensation in the legs.     X-ray flexion-extension cervical spine demonstrates stable C4-C7 ACDF with bony fusion across all disc spaces and corpectomy.  There is no evidence of hardware failure or complication.    At this time the patient is asymptomatic and hardware from her cervical fusion is stable.  She remains hyperreflexic and myelopathic as she did preoperatively however her symptoms have only improved.  At this time I have recommended continued strengthening and a home exercise program.  She will follow up with us as needed.    Aissatou was seen today for follow-up.    Diagnoses and all orders for this visit:    Cervical myelopathy    S/P cervical spinal fusion